# Patient Record
Sex: FEMALE | Race: WHITE | NOT HISPANIC OR LATINO | URBAN - METROPOLITAN AREA
[De-identification: names, ages, dates, MRNs, and addresses within clinical notes are randomized per-mention and may not be internally consistent; named-entity substitution may affect disease eponyms.]

---

## 2021-09-14 ENCOUNTER — APPOINTMENT (RX ONLY)
Dept: URBAN - METROPOLITAN AREA CLINIC 129 | Facility: CLINIC | Age: 76
Setting detail: DERMATOLOGY
End: 2021-09-14

## 2021-09-14 DIAGNOSIS — L81.4 OTHER MELANIN HYPERPIGMENTATION: ICD-10-CM

## 2021-09-14 DIAGNOSIS — D22 MELANOCYTIC NEVI: ICD-10-CM

## 2021-09-14 DIAGNOSIS — I83.9 ASYMPTOMATIC VARICOSE VEINS OF LOWER EXTREMITIES: ICD-10-CM

## 2021-09-14 DIAGNOSIS — Z12.83 ENCOUNTER FOR SCREENING FOR MALIGNANT NEOPLASM OF SKIN: ICD-10-CM

## 2021-09-14 DIAGNOSIS — L57.0 ACTINIC KERATOSIS: ICD-10-CM

## 2021-09-14 DIAGNOSIS — D18.0 HEMANGIOMA: ICD-10-CM

## 2021-09-14 DIAGNOSIS — L73.8 OTHER SPECIFIED FOLLICULAR DISORDERS: ICD-10-CM

## 2021-09-14 DIAGNOSIS — L81.0 POSTINFLAMMATORY HYPERPIGMENTATION: ICD-10-CM

## 2021-09-14 DIAGNOSIS — I78.8 OTHER DISEASES OF CAPILLARIES: ICD-10-CM

## 2021-09-14 DIAGNOSIS — L82.1 OTHER SEBORRHEIC KERATOSIS: ICD-10-CM

## 2021-09-14 DIAGNOSIS — Z71.89 OTHER SPECIFIED COUNSELING: ICD-10-CM

## 2021-09-14 PROBLEM — D23.72 OTHER BENIGN NEOPLASM OF SKIN OF LEFT LOWER LIMB, INCLUDING HIP: Status: ACTIVE | Noted: 2021-09-14

## 2021-09-14 PROBLEM — D22.62 MELANOCYTIC NEVI OF LEFT UPPER LIMB, INCLUDING SHOULDER: Status: ACTIVE | Noted: 2021-09-14

## 2021-09-14 PROBLEM — I83.93 ASYMPTOMATIC VARICOSE VEINS OF BILATERAL LOWER EXTREMITIES: Status: ACTIVE | Noted: 2021-09-14

## 2021-09-14 PROBLEM — D22.61 MELANOCYTIC NEVI OF RIGHT UPPER LIMB, INCLUDING SHOULDER: Status: ACTIVE | Noted: 2021-09-14

## 2021-09-14 PROBLEM — D22.5 MELANOCYTIC NEVI OF TRUNK: Status: ACTIVE | Noted: 2021-09-14

## 2021-09-14 PROBLEM — D18.01 HEMANGIOMA OF SKIN AND SUBCUTANEOUS TISSUE: Status: ACTIVE | Noted: 2021-09-14

## 2021-09-14 PROCEDURE — 17000 DESTRUCT PREMALG LESION: CPT

## 2021-09-14 PROCEDURE — 99203 OFFICE O/P NEW LOW 30 MIN: CPT | Mod: 25

## 2021-09-14 PROCEDURE — ? LIQUID NITROGEN

## 2021-09-14 PROCEDURE — ? COUNSELING

## 2021-09-14 PROCEDURE — 17003 DESTRUCT PREMALG LES 2-14: CPT

## 2021-09-14 PROCEDURE — ? SUNSCREEN RECOMMENDATIONS

## 2021-09-14 ASSESSMENT — LOCATION DETAILED DESCRIPTION DERM
LOCATION DETAILED: LEFT PROXIMAL PRETIBIAL REGION
LOCATION DETAILED: RIGHT PROXIMAL DORSAL FOREARM
LOCATION DETAILED: LEFT SUPERIOR FOREHEAD
LOCATION DETAILED: RIGHT PROXIMAL PRETIBIAL REGION
LOCATION DETAILED: RIGHT VENTRAL DISTAL FOREARM
LOCATION DETAILED: LEFT VENTRAL PROXIMAL FOREARM
LOCATION DETAILED: LEFT INFERIOR MEDIAL UPPER BACK
LOCATION DETAILED: LEFT MEDIAL FOREHEAD
LOCATION DETAILED: LEFT FOREHEAD
LOCATION DETAILED: LEFT ANTERIOR DISTAL THIGH
LOCATION DETAILED: INFERIOR THORACIC SPINE
LOCATION DETAILED: UPPER STERNUM
LOCATION DETAILED: RIGHT SUPERIOR FOREHEAD
LOCATION DETAILED: LEFT ANTERIOR LATERAL PROXIMAL THIGH
LOCATION DETAILED: LEFT PROXIMAL DORSAL FOREARM
LOCATION DETAILED: LEFT INFERIOR MEDIAL FOREHEAD
LOCATION DETAILED: LEFT VENTRAL DISTAL FOREARM
LOCATION DETAILED: RIGHT LATERAL ABDOMEN
LOCATION DETAILED: EPIGASTRIC SKIN
LOCATION DETAILED: RIGHT VENTRAL PROXIMAL FOREARM
LOCATION DETAILED: SUBXIPHOID
LOCATION DETAILED: LEFT MEDIAL UPPER BACK
LOCATION DETAILED: RIGHT ANTERIOR DISTAL THIGH

## 2021-09-14 ASSESSMENT — LOCATION SIMPLE DESCRIPTION DERM
LOCATION SIMPLE: RIGHT PRETIBIAL REGION
LOCATION SIMPLE: ABDOMEN
LOCATION SIMPLE: LEFT PRETIBIAL REGION
LOCATION SIMPLE: CHEST
LOCATION SIMPLE: LEFT FOREHEAD
LOCATION SIMPLE: RIGHT FOREARM
LOCATION SIMPLE: LEFT UPPER BACK
LOCATION SIMPLE: LEFT FOREARM
LOCATION SIMPLE: RIGHT THIGH
LOCATION SIMPLE: RIGHT FOREHEAD
LOCATION SIMPLE: UPPER BACK
LOCATION SIMPLE: LEFT THIGH

## 2021-09-14 ASSESSMENT — LOCATION ZONE DERM
LOCATION ZONE: LEG
LOCATION ZONE: FACE
LOCATION ZONE: TRUNK
LOCATION ZONE: ARM

## 2021-09-14 NOTE — PROCEDURE: LIQUID NITROGEN
Consent: The patient's consent was obtained including but not limited to risks of crusting, scabbing, blistering, scarring, darker or lighter pigmentary change, recurrence, incomplete removal and infection.
Render Post-Care Instructions In Note?: yes
Post-Care Instructions: I reviewed with the patient in detail post-care instructions. Patient is to wear sunprotection, and avoid picking at any of the treated lesions. Pt may apply Vaseline to crusted or scabbing areas.
Render Note In Bullet Format When Appropriate: No
Number Of Freeze-Thaw Cycles: 2 freeze-thaw cycles
Detail Level: Simple
Aperture Size (Optional): B
Duration Of Freeze Thaw-Cycle (Seconds): 3
Application Tool (Optional): Liquid Nitrogen Sprayer

## 2022-06-04 ENCOUNTER — TELEPHONE ENCOUNTER (OUTPATIENT)
Dept: URBAN - METROPOLITAN AREA CLINIC 68 | Facility: CLINIC | Age: 77
End: 2022-06-04

## 2022-06-04 RX ORDER — LISINOPRIL 20 MG/1
LISINOPRIL( 20MG ORAL   ) INACTIVE -HX ENTRY TABLET ORAL
OUTPATIENT
Start: 2019-07-09

## 2022-06-04 RX ORDER — PANTOPRAZOLE SODIUM 40 MG/1
PANTOPRAZOLE SODIUM( 40MG ORAL   ) INACTIVE -HX ENTRY TABLET, DELAYED RELEASE ORAL
OUTPATIENT
Start: 2019-07-25

## 2022-06-04 RX ORDER — PREDNISONE 10 MG/1
PREDNISONE( 10MG (21) ORAL   ) INACTIVE -HX ENTRY TABLET ORAL
OUTPATIENT
Start: 2019-07-25

## 2022-06-04 RX ORDER — LEVETIRACETAM 750 MG/1
LEVETIRACETAM( 750MG ORAL   ) INACTIVE -HX ENTRY TABLET, FILM COATED ORAL
OUTPATIENT
Start: 2019-07-25

## 2022-06-04 RX ORDER — POLYETHYLENE GLYCOL 3350, SODIUM SULFATE, SODIUM CHLORIDE, POTASSIUM CHLORIDE, ASCORBIC ACID, SODIUM ASCORBATE 7.5-2.691G
KIT ORAL
Qty: 1 | Refills: 0 | OUTPATIENT
Start: 2013-08-02 | End: 2019-07-25

## 2022-06-04 RX ORDER — CARBAMAZEPINE 200 MG/1
CARBAMAZEPINE( 200MG ORAL  2 1/2 TABS DAILY ) INACTIVE -HX ENTRY TABLET ORAL
OUTPATIENT
Start: 2019-07-25

## 2022-06-04 RX ORDER — PHENOL 1.4 %
CALCIUM CARBONATE( 600MG ORAL  DAILY ) INACTIVE -HX ENTRY AEROSOL, SPRAY (ML) MUCOUS MEMBRANE DAILY
OUTPATIENT
Start: 2019-07-25

## 2022-06-04 RX ORDER — CHOLECALCIFEROL (VITAMIN D3) 125 MCG
VITAMIN D3( 5000UNIT ORAL  DAILY ) INACTIVE -HX ENTRY CAPSULE ORAL DAILY
OUTPATIENT
Start: 2019-07-25

## 2022-06-05 ENCOUNTER — TELEPHONE ENCOUNTER (OUTPATIENT)
Dept: URBAN - METROPOLITAN AREA CLINIC 68 | Facility: CLINIC | Age: 77
End: 2022-06-05

## 2022-06-05 RX ORDER — DESONIDE 0.5 MG/G
DESONIDE( 0.05% EXTERNAL  DAILY ) ACTIVE -HX ENTRY CREAM TOPICAL DAILY
Status: ACTIVE | COMMUNITY
Start: 2019-07-25

## 2022-06-05 RX ORDER — TRAZODONE HYDROCHLORIDE 100 MG/1
TRAZODONE HCL( 100MG ORAL  2 BED TIME ) ACTIVE -HX ENTRY TABLET, FILM COATED ORAL
Status: ACTIVE | COMMUNITY
Start: 2019-07-25

## 2022-06-05 RX ORDER — LISINOPRIL 20 MG/1
LISINOPRIL( 20MG ORAL  TWO TIMES DAILY ) ACTIVE -HX ENTRY TABLET ORAL
Status: ACTIVE | COMMUNITY
Start: 2019-07-25

## 2022-06-05 RX ORDER — AZELASTINE 137 UG/1
AZELASTINE HCL( 137MCG/SPRAY NASAL   ) ACTIVE -HX ENTRY SPRAY, METERED NASAL
Status: ACTIVE | COMMUNITY
Start: 2019-07-25

## 2022-06-05 RX ORDER — LEVETIRACETAM 750 MG/1
LEVETIRACETAM ER( 750MG ORAL  DAILY ) ACTIVE -HX ENTRY TABLET, FILM COATED, EXTENDED RELEASE ORAL DAILY
Status: ACTIVE | COMMUNITY
Start: 2019-07-25

## 2022-06-05 RX ORDER — TRIAMCINOLONE ACETONIDE 1 MG/G
TRIAMCINOLONE ACETONIDE( 0.1% EXTERNAL  DAILY ) ACTIVE -HX ENTRY CREAM TOPICAL DAILY
Status: ACTIVE | COMMUNITY
Start: 2019-07-25

## 2022-06-25 ENCOUNTER — TELEPHONE ENCOUNTER (OUTPATIENT)
Age: 77
End: 2022-06-25

## 2022-06-25 RX ORDER — LISINOPRIL 20 MG/1
LISINOPRIL( 20MG ORAL   ) INACTIVE -HX ENTRY TABLET ORAL
OUTPATIENT
Start: 2019-07-09

## 2022-06-25 RX ORDER — CHOLECALCIFEROL (VITAMIN D3) 125 MCG
VITAMIN D3( 5000UNIT ORAL  DAILY ) INACTIVE -HX ENTRY CAPSULE ORAL DAILY
OUTPATIENT
Start: 2019-07-25

## 2022-06-25 RX ORDER — LEVETIRACETAM 750 MG/1
LEVETIRACETAM( 750MG ORAL   ) INACTIVE -HX ENTRY TABLET, FILM COATED ORAL
OUTPATIENT
Start: 2019-07-25

## 2022-06-25 RX ORDER — CARBAMAZEPINE 200 MG/1
CARBAMAZEPINE( 200MG ORAL  2 1/2 TABS DAILY ) INACTIVE -HX ENTRY TABLET ORAL
OUTPATIENT
Start: 2019-07-25

## 2022-06-25 RX ORDER — POLYETHYLENE GLYCOL 3350, SODIUM SULFATE, SODIUM CHLORIDE, POTASSIUM CHLORIDE, ASCORBIC ACID, SODIUM ASCORBATE 7.5-2.691G
KIT ORAL
Qty: 1 | Refills: 0 | OUTPATIENT
Start: 2013-08-02 | End: 2019-07-25

## 2022-06-25 RX ORDER — PANTOPRAZOLE 40 MG/1
PANTOPRAZOLE SODIUM( 40MG ORAL   ) INACTIVE -HX ENTRY TABLET, DELAYED RELEASE ORAL
OUTPATIENT
Start: 2019-07-25

## 2022-06-25 RX ORDER — PREDNISONE 10 MG/1
PREDNISONE( 10MG (21) ORAL   ) INACTIVE -HX ENTRY TABLET ORAL
OUTPATIENT
Start: 2019-07-25

## 2022-06-25 RX ORDER — DILTIAZEM HYDROCHLORIDE EXTENDED RELEASE 240 MG/1
DILTIAZEM CD( 240MG ORAL   ) INACTIVE -HX ENTRY CAPSULE, EXTENDED RELEASE ORAL
OUTPATIENT
Start: 2019-07-25

## 2022-06-25 RX ORDER — OMEGA-3 FATTY ACIDS/FISH OIL 360-1200MG
SUPER B COMPLEX(  ORAL  DAILY ) INACTIVE -HX ENTRY CAPSULE ORAL DAILY
OUTPATIENT
Start: 2019-07-25

## 2022-06-26 ENCOUNTER — TELEPHONE ENCOUNTER (OUTPATIENT)
Age: 77
End: 2022-06-26

## 2022-06-26 RX ORDER — AZELASTINE HYDROCHLORIDE 137 UG/1
AZELASTINE HCL( 137MCG/SPRAY NASAL   ) ACTIVE -HX ENTRY SPRAY, METERED NASAL
Status: ACTIVE | COMMUNITY
Start: 2019-07-25

## 2022-06-26 RX ORDER — LEVETIRACETAM 750 MG/1
LEVETIRACETAM ER( 750MG ORAL  DAILY ) ACTIVE -HX ENTRY TABLET, EXTENDED RELEASE ORAL DAILY
Status: ACTIVE | COMMUNITY
Start: 2019-07-25

## 2022-06-26 RX ORDER — LISINOPRIL 20 MG/1
LISINOPRIL( 20MG ORAL  TWO TIMES DAILY ) ACTIVE -HX ENTRY TABLET ORAL
Status: ACTIVE | COMMUNITY
Start: 2019-07-25

## 2022-06-26 RX ORDER — TRAZODONE HYDROCHLORIDE 100 MG/1
TRAZODONE HCL( 100MG ORAL  2 BED TIME ) ACTIVE -HX ENTRY TABLET ORAL
Status: ACTIVE | COMMUNITY
Start: 2019-07-25

## 2022-06-26 RX ORDER — DESONIDE 0.5 MG/G
DESONIDE( 0.05% EXTERNAL  DAILY ) ACTIVE -HX ENTRY CREAM TOPICAL DAILY
Status: ACTIVE | COMMUNITY
Start: 2019-07-25

## 2022-06-26 RX ORDER — TRIAMCINOLONE ACETONIDE 1 MG/G
TRIAMCINOLONE ACETONIDE( 0.1% EXTERNAL  DAILY ) ACTIVE -HX ENTRY CREAM TOPICAL DAILY
Status: ACTIVE | COMMUNITY
Start: 2019-07-25